# Patient Record
Sex: FEMALE | Race: WHITE | Employment: FULL TIME | URBAN - METROPOLITAN AREA
[De-identification: names, ages, dates, MRNs, and addresses within clinical notes are randomized per-mention and may not be internally consistent; named-entity substitution may affect disease eponyms.]

---

## 2020-09-19 ENCOUNTER — HOSPITAL ENCOUNTER (OUTPATIENT)
Age: 25
Discharge: HOME | End: 2020-09-19
Payer: COMMERCIAL

## 2020-09-19 VITALS
HEART RATE: 78 BPM | RESPIRATION RATE: 19 BRPM | TEMPERATURE: 97 F | DIASTOLIC BLOOD PRESSURE: 78 MMHG | OXYGEN SATURATION: 99 % | SYSTOLIC BLOOD PRESSURE: 132 MMHG

## 2020-09-19 DIAGNOSIS — J06.9 UPPER RESPIRATORY TRACT INFECTION, UNSPECIFIED TYPE: Primary | ICD-10-CM

## 2020-09-19 LAB — S PYO AG THROAT QL: NEGATIVE

## 2020-09-19 PROCEDURE — U0003 INFECTIOUS AGENT DETECTION BY NUCLEIC ACID (DNA OR RNA); SEVERE ACUTE RESPIRATORY SYNDROME CORONAVIRUS 2 (SARS-COV-2) (CORONAVIRUS DISEASE [COVID-19]), AMPLIFIED PROBE TECHNIQUE, MAKING USE OF HIGH THROUGHPUT TECHNOLOGIES AS DESCRIBED BY CMS-2020-01-R: HCPCS | Performed by: NURSE PRACTITIONER

## 2020-09-19 PROCEDURE — 87430 STREP A AG IA: CPT | Performed by: NURSE PRACTITIONER

## 2020-09-19 PROCEDURE — 99202 OFFICE O/P NEW SF 15 MIN: CPT | Performed by: NURSE PRACTITIONER

## 2020-09-19 RX ORDER — ALBUTEROL SULFATE 90 UG/1
AEROSOL, METERED RESPIRATORY (INHALATION)
COMMUNITY

## 2020-09-19 NOTE — ED PROVIDER NOTES
Patient Seen in: [address]      History   Patient presents with:  Sore Throat    Stated Complaint: SORE THROAT,SOB/COVID TESTING    HPI      This is a well-appearing 31-year-old female with a history of asthma who prese is well-developed. HENT:      Head: Normocephalic and atraumatic.       Right Ear: Tympanic membrane and ear canal normal.      Left Ear: Tympanic membrane and ear canal normal.      Nose: Nose normal.      Mouth/Throat:      Mouth: Mucous membranes are m diagnosis)    Disposition:  Discharge  9/19/2020 11:26 am    Follow-up:  Primary Care Provider                Medications Prescribed:  Discharge Medication List as of 9/19/2020 11:34 AM

## 2020-09-19 NOTE — ED INITIAL ASSESSMENT (HPI)
Pt here with concerns of a sore throat that began 2 days ago, pt states yesterday her throat was very sore and also felt sob, pt states symptoms have improved today but still has a sore throat, pt denies any fevers

## 2020-09-23 LAB — SARS-COV-2 RNA,QUAL, RT-PCR: NOT DETECTED

## (undated) NOTE — LETTER
Date & Time: 9/19/2020, 11:26 AM  Patient: Anant Mcqueen  Encounter Provider(s):    SHANAE Walters       To Whom It May Concern:    Kailee Leigh was seen and treated in our department on 9/19/2020.  She should not return to work until 9/22/20

## (undated) NOTE — LETTER
Date & Time: 9/22/2020, 5:15 PM  Patient: Benjy Be  Encounter Provider(s):    SHANAE Sullivan       To Whom It May Concern:    Liz Ramirez was seen and treated in our department on 9/19/2020.  She should not return to work until [facility]